# Patient Record
Sex: FEMALE | Race: BLACK OR AFRICAN AMERICAN | NOT HISPANIC OR LATINO | URBAN - METROPOLITAN AREA
[De-identification: names, ages, dates, MRNs, and addresses within clinical notes are randomized per-mention and may not be internally consistent; named-entity substitution may affect disease eponyms.]

---

## 2021-02-04 ENCOUNTER — EMERGENCY (EMERGENCY)
Facility: HOSPITAL | Age: 46
LOS: 1 days | Discharge: HOME | End: 2021-02-04
Attending: EMERGENCY MEDICINE | Admitting: EMERGENCY MEDICINE
Payer: COMMERCIAL

## 2021-02-04 VITALS
TEMPERATURE: 99 F | SYSTOLIC BLOOD PRESSURE: 170 MMHG | HEART RATE: 97 BPM | HEIGHT: 67 IN | OXYGEN SATURATION: 97 % | WEIGHT: 179.9 LBS | RESPIRATION RATE: 18 BRPM | DIASTOLIC BLOOD PRESSURE: 102 MMHG

## 2021-02-04 VITALS
TEMPERATURE: 98 F | HEART RATE: 86 BPM | RESPIRATION RATE: 18 BRPM | SYSTOLIC BLOOD PRESSURE: 149 MMHG | DIASTOLIC BLOOD PRESSURE: 92 MMHG | OXYGEN SATURATION: 100 %

## 2021-02-04 DIAGNOSIS — G93.2 BENIGN INTRACRANIAL HYPERTENSION: ICD-10-CM

## 2021-02-04 DIAGNOSIS — R51.9 HEADACHE, UNSPECIFIED: ICD-10-CM
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
## 2021-02-04 DIAGNOSIS — H11.421 CONJUNCTIVAL EDEMA, RIGHT EYE: ICD-10-CM
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
## 2021-02-04 DIAGNOSIS — S09.90XA UNSPECIFIED INJURY OF HEAD, INITIAL ENCOUNTER: ICD-10-CM
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
## 2021-02-04 DIAGNOSIS — Z20.822 CONTACT WITH AND (SUSPECTED) EXPOSURE TO COVID-19: ICD-10-CM

## 2021-02-04 LAB
ALBUMIN SERPL ELPH-MCNC: 4.6 G/DL — SIGNIFICANT CHANGE UP (ref 3.3–5)
ALP SERPL-CCNC: 86 U/L — SIGNIFICANT CHANGE UP (ref 40–120)
ALT FLD-CCNC: 68 U/L — HIGH (ref 10–45)
ANION GAP SERPL CALC-SCNC: 11 MMOL/L — SIGNIFICANT CHANGE UP (ref 5–17)
APTT BLD: 32.1 SEC — SIGNIFICANT CHANGE UP (ref 27.5–35.5)
AST SERPL-CCNC: 44 U/L — HIGH (ref 10–40)
BASOPHILS # BLD AUTO: 0.05 K/UL — SIGNIFICANT CHANGE UP (ref 0–0.2)
BASOPHILS NFR BLD AUTO: 0.7 % — SIGNIFICANT CHANGE UP (ref 0–2)
BILIRUB SERPL-MCNC: 0.7 MG/DL — SIGNIFICANT CHANGE UP (ref 0.2–1.2)
BUN SERPL-MCNC: 10 MG/DL — SIGNIFICANT CHANGE UP (ref 7–23)
CALCIUM SERPL-MCNC: 9.7 MG/DL — SIGNIFICANT CHANGE UP (ref 8.4–10.5)
CHLORIDE SERPL-SCNC: 103 MMOL/L — SIGNIFICANT CHANGE UP (ref 96–108)
CO2 SERPL-SCNC: 26 MMOL/L — SIGNIFICANT CHANGE UP (ref 22–31)
CREAT SERPL-MCNC: 0.77 MG/DL — SIGNIFICANT CHANGE UP (ref 0.5–1.3)
EOSINOPHIL # BLD AUTO: 0.25 K/UL — SIGNIFICANT CHANGE UP (ref 0–0.5)
EOSINOPHIL NFR BLD AUTO: 3.4 % — SIGNIFICANT CHANGE UP (ref 0–6)
GLUCOSE SERPL-MCNC: 93 MG/DL — SIGNIFICANT CHANGE UP (ref 70–99)
HCT VFR BLD CALC: 43.6 % — SIGNIFICANT CHANGE UP (ref 34.5–45)
HGB BLD-MCNC: 13.8 G/DL — SIGNIFICANT CHANGE UP (ref 11.5–15.5)
IMM GRANULOCYTES NFR BLD AUTO: 0.1 % — SIGNIFICANT CHANGE UP (ref 0–1.5)
INR BLD: 0.99 — SIGNIFICANT CHANGE UP (ref 0.88–1.16)
LYMPHOCYTES # BLD AUTO: 3.76 K/UL — HIGH (ref 1–3.3)
LYMPHOCYTES # BLD AUTO: 51.5 % — HIGH (ref 13–44)
MCHC RBC-ENTMCNC: 29.4 PG — SIGNIFICANT CHANGE UP (ref 27–34)
MCHC RBC-ENTMCNC: 31.7 GM/DL — LOW (ref 32–36)
MCV RBC AUTO: 93 FL — SIGNIFICANT CHANGE UP (ref 80–100)
MONOCYTES # BLD AUTO: 0.68 K/UL — SIGNIFICANT CHANGE UP (ref 0–0.9)
MONOCYTES NFR BLD AUTO: 9.3 % — SIGNIFICANT CHANGE UP (ref 2–14)
NEUTROPHILS # BLD AUTO: 2.55 K/UL — SIGNIFICANT CHANGE UP (ref 1.8–7.4)
NEUTROPHILS NFR BLD AUTO: 35 % — LOW (ref 43–77)
NRBC # BLD: 0 /100 WBCS — SIGNIFICANT CHANGE UP (ref 0–0)
PLATELET # BLD AUTO: 308 K/UL — SIGNIFICANT CHANGE UP (ref 150–400)
POTASSIUM SERPL-MCNC: 4.2 MMOL/L — SIGNIFICANT CHANGE UP (ref 3.5–5.3)
POTASSIUM SERPL-SCNC: 4.2 MMOL/L — SIGNIFICANT CHANGE UP (ref 3.5–5.3)
PROT SERPL-MCNC: 7.8 G/DL — SIGNIFICANT CHANGE UP (ref 6–8.3)
PROTHROM AB SERPL-ACNC: 11.9 SEC — SIGNIFICANT CHANGE UP (ref 10.6–13.6)
RBC # BLD: 4.69 M/UL — SIGNIFICANT CHANGE UP (ref 3.8–5.2)
RBC # FLD: 12.5 % — SIGNIFICANT CHANGE UP (ref 10.3–14.5)
SARS-COV-2 RNA SPEC QL NAA+PROBE: SIGNIFICANT CHANGE UP
SODIUM SERPL-SCNC: 140 MMOL/L — SIGNIFICANT CHANGE UP (ref 135–145)
WBC # BLD: 7.3 K/UL — SIGNIFICANT CHANGE UP (ref 3.8–10.5)
WBC # FLD AUTO: 7.3 K/UL — SIGNIFICANT CHANGE UP (ref 3.8–10.5)

## 2021-02-04 PROCEDURE — 99285 EMERGENCY DEPT VISIT HI MDM: CPT

## 2021-02-04 PROCEDURE — 96374 THER/PROPH/DIAG INJ IV PUSH: CPT

## 2021-02-04 PROCEDURE — 99284 EMERGENCY DEPT VISIT MOD MDM: CPT | Mod: 25

## 2021-02-04 PROCEDURE — U0005: CPT

## 2021-02-04 PROCEDURE — 85730 THROMBOPLASTIN TIME PARTIAL: CPT

## 2021-02-04 PROCEDURE — 85025 COMPLETE CBC W/AUTO DIFF WBC: CPT

## 2021-02-04 PROCEDURE — 80053 COMPREHEN METABOLIC PANEL: CPT

## 2021-02-04 PROCEDURE — G1004: CPT

## 2021-02-04 PROCEDURE — 36415 COLL VENOUS BLD VENIPUNCTURE: CPT

## 2021-02-04 PROCEDURE — 70450 CT HEAD/BRAIN W/O DYE: CPT | Mod: 26,MG

## 2021-02-04 PROCEDURE — 85610 PROTHROMBIN TIME: CPT

## 2021-02-04 PROCEDURE — U0003: CPT

## 2021-02-04 PROCEDURE — 70450 CT HEAD/BRAIN W/O DYE: CPT

## 2021-02-04 RX ORDER — SODIUM CHLORIDE 9 MG/ML
1000 INJECTION INTRAMUSCULAR; INTRAVENOUS; SUBCUTANEOUS ONCE
Refills: 0 | Status: COMPLETED | OUTPATIENT
Start: 2021-02-04 | End: 2021-02-04

## 2021-02-04 RX ORDER — ACETAMINOPHEN 500 MG
650 TABLET ORAL ONCE
Refills: 0 | Status: COMPLETED | OUTPATIENT
Start: 2021-02-04 | End: 2021-02-04

## 2021-02-04 RX ORDER — ACETAZOLAMIDE 250 MG/1
2 TABLET ORAL
Qty: 28 | Refills: 0
Start: 2021-02-04 | End: 2021-02-10

## 2021-02-04 RX ORDER — METOCLOPRAMIDE HCL 10 MG
10 TABLET ORAL ONCE
Refills: 0 | Status: COMPLETED | OUTPATIENT
Start: 2021-02-04 | End: 2021-02-04

## 2021-02-04 RX ADMIN — Medication 650 MILLIGRAM(S): at 15:14

## 2021-02-04 RX ADMIN — SODIUM CHLORIDE 1000 MILLILITER(S): 9 INJECTION INTRAMUSCULAR; INTRAVENOUS; SUBCUTANEOUS at 15:15

## 2021-02-04 RX ADMIN — Medication 10 MILLIGRAM(S): at 15:14

## 2021-02-04 NOTE — ED PROVIDER NOTE - PATIENT PORTAL LINK FT
You can access the FollowMyHealth Patient Portal offered by [facility] by registering at the following website: [email or web address]. By joining [employer]’s FollowMyHealth portal, you will also be able to view your health information using other applications (apps) compatible with our system.

## 2021-02-04 NOTE — ED PROVIDER NOTE - PROGRESS NOTE DETAILS
[place]: labs grossly wnl. CT showing "No intracranial hemorrhage or calvarial fracture. Findings compatible with idiopathic intracranial hypertension." Pt has known IIH. Pt now completely asymptomatic. Has no symptoms or concerning exam findings to suggest sudden worsening to warrant immediate ED LP. d/w pt options for admission vs. outpt f/u. prefers outpt f/u. will restart acetazolamide. has neurolgist at [facility] that she wants to follow up with. Clinically no indication for further emergent ED workup or hospitalization at this time. Comfortable for dc, outpt f/u.
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
## 2021-02-04 NOTE — ED PROVIDER NOTE - PHYSICAL EXAMINATION
given 2 drops tetracaine to R eye, fluoroscein: no FB or corneal abrasion. very mild chemosis. no conjunctival color changes. PERRl EOMI, no nystagmus. Unable to visualize fundi.  R eye: IOP 19  b/l acuity 20/30    CN intact. Strength 5/5. Normal F-->N, H-->S. Steady unassisted gait. No pronator drift. Sensation intact. Normal speech, no dysarthria. No carotid bruits. Negative Romberg, normal tandem gait.  No spinal ttp, neck FROM.  No bony ttp, FROM all extremities. Normal equal distal pulses.

## 2021-02-04 NOTE — ED PROVIDER NOTE - NSFOLLOWUPINSTRUCTIONS_ED_ALL_ED_FT
Can take tylenol 650mg AND/OR motrin 600mg every 6hrs as needed for pain.  Stay well hydrated.  Follow up with primary doctor within 1-2 days.  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 Can call [phone] to schedule appointment.   Return to ER for persistent fever/vomit, uncontrolled pain, focal weakness/numbness, vision changes, worsening breathing, worsening lightheaded.    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 It is unclear what exactly is causing that. It is very important to Follow up with ophthalmologist within 1-2 days!! Can follow up at [facility] ([facility]). Can call [phone] to schedule appointment.     Head Injury, Adult    There are many types of head injuries. Head injuries can be as minor as a bump, or they can be more severe. More severe head injuries include:  A jarring injury to the brain (concussion).  A bruise of the brain (contusion). This means there is bleeding in the brain that can cause swelling.  A cracked skull (skull fracture).  Bleeding in the brain that collects, clots, and forms a bump (hematoma).    After a head injury, you may need to be observed for a while in the emergency department or urgent care. Sometimes admission to the hospital is needed.    After a head injury has happened, most problems occur within the first 24 hours, but side effects may occur up to 7–10 days after the injury. It is important to watch your condition for any changes.    What are the causes?  There are many possible causes of a head injury. A serious head injury may happen to someone who is in a car accident (motor vehicle collision). Other causes of major head injuries include bicycle or motorcycle accidents, sports injuries, and falls.    Risk factors  This condition is more likely to occur in people who:  Drink a lot of alcohol or use drugs.  Are over the age of 65.  Are at risk for falls.    What are the symptoms?  There are many possible symptoms of a head injury. Visible symptoms of a head injury include a bruise, bump, or bleeding at the site of the injury. Other non-visible symptoms include:  Feeling sleepy or not being able to stay awake.  Passing out.  Headache.  Seizures.  Dizziness.  Confusion.  Memory problems.  Nausea or vomiting.  Other possible symptoms that may develop after the head injury include:  Poor attention and concentration.  Fatigue or tiring easily.  Irritability.  Being uncomfortable around bright lights or loud noises.  Anxiety or depression.  Disturbed sleep.    How is this diagnosed?  This condition can usually be diagnosed based on your symptoms, a description of the injury, and a physical exam. You may also have imaging tests done, such as a CT scan or MRI. You will also be closely watched.    How is this treated?  Treatment for this condition depends on the severity and type of injury you have. The main goal of treatment is to prevent complications and allow the brain time to heal.    For mild head injury, you may be sent home and treatment may include:  Observation. A responsible adult should stay with you for 24 hours after your injury and check on you often.  Physical rest.  Brain rest.  Pain medicines.  For severe brain injury, treatment may include:  Close observation. This includes hospitalization with frequent physical exams. You may need to go to a hospital that specializes in head injury.  Pain medicines.  Breathing support. This may include using a ventilator.  Managing the pressure inside the brain (intracranial pressure, or ICP). This may include:  Monitoring the ICP.  Giving medicines to decrease the ICP.  Positioning you to decrease the ICP.  Medicine to prevent seizures.  Surgery to stop bleeding or to remove blood clots (craniotomy).  Surgery to remove part of the skull (decompressive craniectomy). This allows room for the brain to swell.    Follow these instructions at home:  Activity   Rest as much as possible and avoid activities that are physically hard or tiring.  Make sure you get enough sleep.  Limit activities that require a lot of thought or attention, such as:  Watching TV.  Playing memory games and puzzles.  Job-related work or homework.  Working on the computer, social media, and texting.  Avoid activities that could cause another head injury, such as playing sports, until your health care provider approves. Having another head injury, especially before the first one has healed, can be dangerous.    Ask your health care provider when it is safe for you to return to your regular activities, including work or school. Ask your health care provider for a step-by-step plan for gradually returning to activities.  Ask your health care provider when you can drive, ride a bicycle, or use heavy machinery. Your ability to react may be slower after a brain injury. Never do these activities if you are dizzy.    Lifestyle     Do not drink alcohol until your health care provider approves, and avoid drug use. Alcohol and certain drugs may slow your recovery and can put you at risk of further injury.  If it is harder than usual to remember things, write them down.  If you are easily distracted, try to do one thing at a time.  Talk with family members or close friends when making important decisions.  Tell your friends, family, a trusted colleague, and  about your injury, symptoms, and restrictions. Have them watch for any new or worsening problems.  General instructions     Take over-the-counter and prescription medicines only as told by your health care provider.  Have someone stay with you for 24 hours after your head injury. This person should watch you for any changes in your symptoms and be ready to seek medical help, as needed.  Keep all follow-up visits as told by your health care provider. This is important.    How is this prevented?  Work on improving your balance and strength to avoid falls.  Wear a seatbelt when you are in a moving vehicle.  Wear a helmet when riding a bicycle, skiing, or doing any other sport or activity that has a risk of injury.  Drink alcohol only in moderation.  Take safety measures in your home, such as:  Removing clutter and tripping hazards from floors and stairways.  Using grab bars in bathrooms and handrails by stairs.  Placing non-slip mats on floors and in bathtubs.  Improving lighting in dim areas.    Get help right away if:  You have:  A severe headache that is not helped by medicine.  Trouble walking, have weakness in your arms and legs, or lose your balance.  Clear or bloody fluid coming from your nose or ears.  Changes in your vision.  A seizure.  You vomit.  Your symptoms get worse.  Your speech is slurred.  You pass out.  You are sleepier and have trouble staying awake.  Your pupils change size.  These symptoms may represent a serious problem that is an emergency. Do not wait to see if the symptoms will go away. Get medical help right away. Call your local emergency services (911 in the [place] Do not drive yourself to the hospital.     Post-Concussion Syndrome    A concussion is a brain injury from a direct hit (blow) to your head or body. This blow causes your brain to shake quickly back and forth inside your skull. This can damage brain cells and cause chemical changes in your brain. Concussions are usually not life-threatening but can cause several serious symptoms.    Post-concussion syndrome is when symptoms that occur after a concussion last longer than normal. These symptoms can last from weeks to months.    What are the causes?  The cause of this condition is not known. It can happen whether your head injury was mild or severe.    What increases the risk?  You are more likely to develop this condition if:  You are female.  You are a child, teen, or young adult.  You had a past head injury.  You have a history of headaches.  You have depression or anxiety.    What are the signs or symptoms?    Physical symptoms   Headaches.  Tiredness.  Dizziness.  Weakness.  Blurry vision.  Sensitivity to light.  Hearing difficulties.  Mental and emotional symptoms     Memory difficulties.  Difficulty with concentration.  Difficulty sleeping or staying asleep.  Feeling irritable.  Anxiety or depression.  Difficulty learning new things.    How is this diagnosed?  This condition may be diagnosed based on:  Your symptoms.  A description of your injury.  Your medical history.  Your health care provider may order other tests such as:  Brain function tests (neurological testing).  CT scan.    How is this treated?  Treatment for this condition may depend on your symptoms. Symptoms usually go away on their own over time. Treatments may include:  Medicines for headaches.  Resting your brain and body for a few days after your injury.  Rehabilitation therapy, such as:  Physical or occupational therapy. This may include exercises to help with balance and dizziness.  Mental health counseling.  Speech therapy.  Vision therapy. A brain and eye specialist can recommend treatments for vision problems.    Follow these instructions at home:  Medicines     Take over-the-counter and prescription medicines only as told by your health care provider.  Avoid opioid prescription pain medicines when recovering from a concussion.  Activity     Limit your mental activities for the first few days after your injury, such as:  Homework or job-related work.  Complex thinking.  Watching TV, and using a computer or phone.  Playing memory games and puzzles.  Gradually return to your normal activity level. If a certain activity brings on your symptoms, stop or slow down until you can do the activity without it triggering your symptoms.  Limit physical activity, such as exercise or sports, for the first few days after a concussion. Gradually return to normal activity as told by your health care provider.  If a certain activity brings on your symptoms, stop or slow down until you can do the activity without it triggering your symptoms.  Rest. Rest helps your brain heal. Make sure you:  Get plenty of sleep at night. Most adults should get at least 7–9 hours of sleep each night.  Rest during the day. Take naps or rest breaks when you feel tired.  Do not do high-risk activities that could cause a second concussion, such as riding a bike or playing sports. Having another concussion before the first one has healed can be dangerous.    General instructions   Do not drink alcohol until your health care provider says you can.  Keep track of the frequency and the severity of your symptoms. Give this information to your health care provider.  Keep all follow-up visits as directed by your health care provider. This is important.  Contact a health care provider if:  Your symptoms do not improve.  You have another injury.  Get help right away if you:  Have a severe or worsening headache.  Are confused.  Have trouble staying awake.  Pass out.  Vomit.  Have weakness or numbness in any part of your body.  Have a seizure.  Have trouble speaking.  Summary  Post-concussion syndrome is when symptoms that occur after a concussion last longer than normal.  Symptoms usually go away on their own over time. Depending on your symptoms, you may need treatment, such as medicines or rehabilitation therapy.  Rest your brain and body for a few days after your injury. Gradually return to activities, as told by your health care provider.  Get plenty of sleep, and avoid alcohol and opioid pain medicines while recovering from a concussion. Can take tylenol 650mg AND/OR motrin 600mg every 6hrs as needed for pain.  Stay well hydrated.  Follow up with primary doctor within 1-2 days.  Take acetazolamide as prescribed.   You also have a history of pseudotumor and your CT scan shows findings consistent with that  - it is very important to follow up with neurologist within the next week!!! Can call [phone] to schedule appointment. If you do not address this, you can have permanent damage or vision loss.  Return to ER for persistent fever/vomit, uncontrolled pain, focal weakness/numbness, vision changes, worsening breathing, worsening lightheaded.    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 It is unclear what exactly is causing that. It is very important to Follow up with ophthalmologist within 1-2 days!! Can follow up at [facility] ([facility]). Can call [phone] to schedule appointment.     Head Injury, Adult    There are many types of head injuries. Head injuries can be as minor as a bump, or they can be more severe. More severe head injuries include:  A jarring injury to the brain (concussion).  A bruise of the brain (contusion). This means there is bleeding in the brain that can cause swelling.  A cracked skull (skull fracture).  Bleeding in the brain that collects, clots, and forms a bump (hematoma).    After a head injury, you may need to be observed for a while in the emergency department or urgent care. Sometimes admission to the hospital is needed.    After a head injury has happened, most problems occur within the first 24 hours, but side effects may occur up to 7–10 days after the injury. It is important to watch your condition for any changes.    What are the causes?  There are many possible causes of a head injury. A serious head injury may happen to someone who is in a car accident (motor vehicle collision). Other causes of major head injuries include bicycle or motorcycle accidents, sports injuries, and falls.    Risk factors  This condition is more likely to occur in people who:  Drink a lot of alcohol or use drugs.  Are over the age of 65.  Are at risk for falls.    What are the symptoms?  There are many possible symptoms of a head injury. Visible symptoms of a head injury include a bruise, bump, or bleeding at the site of the injury. Other non-visible symptoms include:  Feeling sleepy or not being able to stay awake.  Passing out.  Headache.  Seizures.  Dizziness.  Confusion.  Memory problems.  Nausea or vomiting.  Other possible symptoms that may develop after the head injury include:  Poor attention and concentration.  Fatigue or tiring easily.  Irritability.  Being uncomfortable around bright lights or loud noises.  Anxiety or depression.  Disturbed sleep.    How is this diagnosed?  This condition can usually be diagnosed based on your symptoms, a description of the injury, and a physical exam. You may also have imaging tests done, such as a CT scan or MRI. You will also be closely watched.    How is this treated?  Treatment for this condition depends on the severity and type of injury you have. The main goal of treatment is to prevent complications and allow the brain time to heal.    For mild head injury, you may be sent home and treatment may include:  Observation. A responsible adult should stay with you for 24 hours after your injury and check on you often.  Physical rest.  Brain rest.  Pain medicines.  For severe brain injury, treatment may include:  Close observation. This includes hospitalization with frequent physical exams. You may need to go to a hospital that specializes in head injury.  Pain medicines.  Breathing support. This may include using a ventilator.  Managing the pressure inside the brain (intracranial pressure, or ICP). This may include:  Monitoring the ICP.  Giving medicines to decrease the ICP.  Positioning you to decrease the ICP.  Medicine to prevent seizures.  Surgery to stop bleeding or to remove blood clots (craniotomy).  Surgery to remove part of the skull (decompressive craniectomy). This allows room for the brain to swell.    Follow these instructions at home:  Activity   Rest as much as possible and avoid activities that are physically hard or tiring.  Make sure you get enough sleep.  Limit activities that require a lot of thought or attention, such as:  Watching TV.  Playing memory games and puzzles.  Job-related work or homework.  Working on the computer, social media, and texting.  Avoid activities that could cause another head injury, such as playing sports, until your health care provider approves. Having another head injury, especially before the first one has healed, can be dangerous.    Ask your health care provider when it is safe for you to return to your regular activities, including work or school. Ask your health care provider for a step-by-step plan for gradually returning to activities.  Ask your health care provider when you can drive, ride a bicycle, or use heavy machinery. Your ability to react may be slower after a brain injury. Never do these activities if you are dizzy.    Lifestyle     Do not drink alcohol until your health care provider approves, and avoid drug use. Alcohol and certain drugs may slow your recovery and can put you at risk of further injury.  If it is harder than usual to remember things, write them down.  If you are easily distracted, try to do one thing at a time.  Talk with family members or close friends when making important decisions.  Tell your friends, family, a trusted colleague, and  about your injury, symptoms, and restrictions. Have them watch for any new or worsening problems.  General instructions     Take over-the-counter and prescription medicines only as told by your health care provider.  Have someone stay with you for 24 hours after your head injury. This person should watch you for any changes in your symptoms and be ready to seek medical help, as needed.  Keep all follow-up visits as told by your health care provider. This is important.    How is this prevented?  Work on improving your balance and strength to avoid falls.  Wear a seatbelt when you are in a moving vehicle.  Wear a helmet when riding a bicycle, skiing, or doing any other sport or activity that has a risk of injury.  Drink alcohol only in moderation.  Take safety measures in your home, such as:  Removing clutter and tripping hazards from floors and stairways.  Using grab bars in bathrooms and handrails by stairs.  Placing non-slip mats on floors and in bathtubs.  Improving lighting in dim areas.    Get help right away if:  You have:  A severe headache that is not helped by medicine.  Trouble walking, have weakness in your arms and legs, or lose your balance.  Clear or bloody fluid coming from your nose or ears.  Changes in your vision.  A seizure.  You vomit.  Your symptoms get worse.  Your speech is slurred.  You pass out.  You are sleepier and have trouble staying awake.  Your pupils change size.  These symptoms may represent a serious problem that is an emergency. Do not wait to see if the symptoms will go away. Get medical help right away. Call your local emergency services (911 in the [place] Do not drive yourself to the hospital.     Post-Concussion Syndrome    A concussion is a brain injury from a direct hit (blow) to your head or body. This blow causes your brain to shake quickly back and forth inside your skull. This can damage brain cells and cause chemical changes in your brain. Concussions are usually not life-threatening but can cause several serious symptoms.    Post-concussion syndrome is when symptoms that occur after a concussion last longer than normal. These symptoms can last from weeks to months.    What are the causes?  The cause of this condition is not known. It can happen whether your head injury was mild or severe.    What increases the risk?  You are more likely to develop this condition if:  You are female.  You are a child, teen, or young adult.  You had a past head injury.  You have a history of headaches.  You have depression or anxiety.    What are the signs or symptoms?    Physical symptoms   Headaches.  Tiredness.  Dizziness.  Weakness.  Blurry vision.  Sensitivity to light.  Hearing difficulties.  Mental and emotional symptoms     Memory difficulties.  Difficulty with concentration.  Difficulty sleeping or staying asleep.  Feeling irritable.  Anxiety or depression.  Difficulty learning new things.    How is this diagnosed?  This condition may be diagnosed based on:  Your symptoms.  A description of your injury.  Your medical history.  Your health care provider may order other tests such as:  Brain function tests (neurological testing).  CT scan.    How is this treated?  Treatment for this condition may depend on your symptoms. Symptoms usually go away on their own over time. Treatments may include:  Medicines for headaches.  Resting your brain and body for a few days after your injury.  Rehabilitation therapy, such as:  Physical or occupational therapy. This may include exercises to help with balance and dizziness.  Mental health counseling.  Speech therapy.  Vision therapy. A brain and eye specialist can recommend treatments for vision problems.    Follow these instructions at home:  Medicines     Take over-the-counter and prescription medicines only as told by your health care provider.  Avoid opioid prescription pain medicines when recovering from a concussion.  Activity     Limit your mental activities for the first few days after your injury, such as:  Homework or job-related work.  Complex thinking.  Watching TV, and using a computer or phone.  Playing memory games and puzzles.  Gradually return to your normal activity level. If a certain activity brings on your symptoms, stop or slow down until you can do the activity without it triggering your symptoms.  Limit physical activity, such as exercise or sports, for the first few days after a concussion. Gradually return to normal activity as told by your health care provider.  If a certain activity brings on your symptoms, stop or slow down until you can do the activity without it triggering your symptoms.  Rest. Rest helps your brain heal. Make sure you:  Get plenty of sleep at night. Most adults should get at least 7–9 hours of sleep each night.  Rest during the day. Take naps or rest breaks when you feel tired.  Do not do high-risk activities that could cause a second concussion, such as riding a bike or playing sports. Having another concussion before the first one has healed can be dangerous.    General instructions   Do not drink alcohol until your health care provider says you can.  Keep track of the frequency and the severity of your symptoms. Give this information to your health care provider.  Keep all follow-up visits as directed by your health care provider. This is important.  Contact a health care provider if:  Your symptoms do not improve.  You have another injury.  Get help right away if you:  Have a severe or worsening headache.  Are confused.  Have trouble staying awake.  Pass out.  Vomit.  Have weakness or numbness in any part of your body.  Have a seizure.  Have trouble speaking.  Summary  Post-concussion syndrome is when symptoms that occur after a concussion last longer than normal.  Symptoms usually go away on their own over time. Depending on your symptoms, you may need treatment, such as medicines or rehabilitation therapy.  Rest your brain and body for a few days after your injury. Gradually return to activities, as told by your health care provider.  Get plenty of sleep, and avoid alcohol and opioid pain medicines while recovering from a concussion.

## 2021-02-04 NOTE — ED PROVIDER NOTE - CARE PLAN
Principal Discharge DX:	Headache  Secondary Diagnosis:	Minor head trauma  Secondary Diagnosis:	Chemosis   Principal Discharge DX:	Headache  Secondary Diagnosis:	Minor head trauma  Secondary Diagnosis:	Chemosis  Secondary Diagnosis:	Idiopathic intracranial hypertension